# Patient Record
Sex: MALE | Race: WHITE | NOT HISPANIC OR LATINO | URBAN - METROPOLITAN AREA
[De-identification: names, ages, dates, MRNs, and addresses within clinical notes are randomized per-mention and may not be internally consistent; named-entity substitution may affect disease eponyms.]

---

## 2020-06-22 ENCOUNTER — IMPORTED ENCOUNTER (OUTPATIENT)
Dept: URBAN - METROPOLITAN AREA CLINIC 31 | Facility: CLINIC | Age: 30
End: 2020-06-22

## 2020-06-22 PROBLEM — T15.02XA: Noted: 2020-06-22

## 2020-06-22 PROCEDURE — 65222 REMOVE FOREIGN BODY FROM EYE: CPT

## 2020-06-22 PROCEDURE — 99203 OFFICE O/P NEW LOW 30 MIN: CPT

## 2020-06-22 NOTE — PATIENT DISCUSSION
Corneal foreign body OS:  Risks benefits alternatives to foreign body removal was discussed with the patient. Embedded material was removed from the cornea with a 30 guage needle and Doritas Reel. Topical antibiotic drops instilled Tobramycin q two hrs for two days. Explained vision will be blurry for up to a week before completely clears.

## 2022-04-01 ASSESSMENT — VISUAL ACUITY
OD_CC: 20/20
OS_CC: 20/20

## 2024-01-16 NOTE — PATIENT DISCUSSION
"Diabetes Care Specialist Follow-up Note  Author: Vanessa Moore RD  Date: 1/11/2024    Program Intake  Reason for Diabetes Program Visit:: Intervention  Type of Intervention:: Individual  Individual: Education  Education: Self-Management Skill Review  Current diabetes risk level:: low (risk score 0.5)  In the last 12 months, have you:: used emergency room services  Was the ER or hospital admission related to diabetes?: Yes    Lab Results   Component Value Date    HGBA1C 7.4 (H) 12/22/2023     A1c Pre Diabetes Care Specialist Intervention:  10.7% (4/12/23)    Clinical    Weight: 77 kg (169 lb 12.8 oz)   Height: 6' 1" (185.4 cm)   Body mass index is 22.4 kg/m².  Wt loss of 1-2 lbs since last visit on 11/7/23     Medications:  - lantus 25 units BID (changed from 28 units once daily during hospitalization 12/22-12/24)  - Novolog 7/12/12 with meals + 1:50 > 150    Physical activity/Exercise:   At work only -- 5 days per week for 8-9 hours on feet    SMBG:   Pt has been off of Dexcom ~ 1 month due to issues with changing supplier from DME to pharmacy.  Pt awaiting PA/insurance approval for new Dexcom G7  but has sensors.  Phone not compatible with G7 roni.  Pt reports checking BG 4x/d (before meals & bedtime).   upon waking today per pt.    Today's interventions were provided through individual discussion.    Patient verbalized understanding of instruction.  Pt was able to return back demonstration of instructions today. Patient understood key points, needs reinforcement and further instruction.     Diabetes Self-Management Care Plan Review and Evaluation of Progress:    During today's follow-up Jose Francisco's Diabetes Self-Management Care Plan progress was reviewed and progress was evaluated including his/her input. Jose Francisco has agreed to continue his/her journey to improve/maintain overall diabetes control by continuing to set health goals. See care plan progress below.      Care Plan: Diabetes Management   Updates made " stable. since 12/17/2023 12:00 AM        Problem: Healthy Eating         Goal: Patient agrees to begin counting carbohydrates & attempt to keep meals </= 60g carbohydrate & snacks </= 15g carbohydrate in preparation for possibility of transitioning to pump therapy in the future.    Start Date: 5/23/2023   Expected End Date: 2/22/2024   This Visit's Progress: No change   Recent Progress: No change   Priority: High   Barriers: No Barriers Identified   Note:    5/23/23: Reviewed sources of carbohydrate & appropriate portion sizes.  Discussed carbohydrate counting.  Encouraged pt to read nutrition facts labels for serving size & grams of total carbohydrate.  Gave goal of </= 60g carbohydrate per meal & </= 15g carbohydrate per snack but ultimately I believe that pt would benefit from I:C ratio vs set mealtime insulin doses to allow for more flexibility in meal size.  Will discuss with endo NP.    7/14/23: Per food recall, pt keeping 2/3 of meals </= 60g carb but exceeding carb goals at lunch meal & exceeding </= 15g carb goal at snack.  Pt does not seem to have been carb counting but was instructed to start use of carb ratio 1:10 on 5/29 which he states he never started.    8/17/23: Per food recall, pt keeping 2/3 meals/d </= 60g carb.  Exceeded carb goal at supper meal.  Pt still not carb counting or using carb ratio.  Stressed importance of beginning to carb count if insulin pump is desired in the future.    9/19/23: Per food recall, pt still keeping 2/3 meals/d </= 60g carb.  Exceeded carb goal at lunch meal.    11/7/23:   Food recall:  Breakfast = peanut butter sandwich with 2 cups of milk or 3 cups honey bunches of oats cereal with 2 cups milk  Lunch = sandwich or leftovers  Dinner = fish with crawfish etouffee on top, 1/2 cup corn, diet mountain dew  Per food recall, 2/3 meals still meeting goal of </= 60g carb.  Discussed high carb content of breakfast meal when eating cereal.  Pt not carb counting.  Again discussed that  he will need to carb count for insulin pump in the future so should start practicing now.    1/11/24: 24h food recall as follows: breakfast = biscuit, hashbrown, 1 cup milk; lunch = salad, sandwich or grilled fish/chicken; dinner = 1.5 cups rice with gravy, meat, green beans/broccoli/or 1/2 cup corn, 1 can Sprite.  Pt reports that he has been drinking flavored water, Gatorade Zero, & 1 can of Sprite daily.  Pt states that he has not really been snacking due to decrease in appetite.  Pt has not been actively counting carbohydrates but will begin as he is motivated to start insulin pump therapy in the near future.  2/3 of daily meals still within 60g carb goal.         Follow Up Plan     Pt interested in [employer] insulin pump with integrated Dexcom G7.  Pt failed IVIG therapy for polyneuropathy with last treatment 11/13/23.  Follow up in about 6 weeks (around 2/22/2024) for review of BG logs/Dexcom report & goal progress.    Today's care plan and follow up schedule was discussed with patient.  Jose Francisco verbalized understanding of the care plan, goals, and agrees to follow up plan.        The patient was encouraged to communicate with his/her health care provider/physician and care team regarding his/her condition(s) and treatment.  I provided the patient with my contact information today and encouraged to contact me via phone or [facility] Patient Portal as needed.     Length of Visit   Total Time: 30 Minutes